# Patient Record
Sex: MALE | Race: WHITE | NOT HISPANIC OR LATINO
[De-identification: names, ages, dates, MRNs, and addresses within clinical notes are randomized per-mention and may not be internally consistent; named-entity substitution may affect disease eponyms.]

---

## 2021-11-12 ENCOUNTER — TRANSCRIPTION ENCOUNTER (OUTPATIENT)
Age: 36
End: 2021-11-12

## 2021-11-13 ENCOUNTER — APPOINTMENT (OUTPATIENT)
Dept: DISASTER EMERGENCY | Facility: HOSPITAL | Age: 36
End: 2021-11-13

## 2021-11-13 ENCOUNTER — OUTPATIENT (OUTPATIENT)
Dept: OUTPATIENT SERVICES | Facility: HOSPITAL | Age: 36
LOS: 1 days | End: 2021-11-13
Payer: COMMERCIAL

## 2021-11-13 VITALS
DIASTOLIC BLOOD PRESSURE: 91 MMHG | HEART RATE: 112 BPM | WEIGHT: 315 LBS | HEIGHT: 75 IN | OXYGEN SATURATION: 94 % | RESPIRATION RATE: 18 BRPM | SYSTOLIC BLOOD PRESSURE: 141 MMHG | TEMPERATURE: 99 F

## 2021-11-13 VITALS
TEMPERATURE: 99 F | OXYGEN SATURATION: 96 % | RESPIRATION RATE: 18 BRPM | DIASTOLIC BLOOD PRESSURE: 76 MMHG | HEART RATE: 102 BPM | SYSTOLIC BLOOD PRESSURE: 115 MMHG

## 2021-11-13 DIAGNOSIS — U07.1 COVID-19: ICD-10-CM

## 2021-11-13 PROCEDURE — M0243: CPT

## 2021-11-13 RX ORDER — SODIUM CHLORIDE 9 MG/ML
250 INJECTION INTRAMUSCULAR; INTRAVENOUS; SUBCUTANEOUS
Refills: 0 | Status: DISCONTINUED | OUTPATIENT
Start: 2021-11-13 | End: 2021-11-28

## 2021-11-13 RX ADMIN — SODIUM CHLORIDE 310 MILLILITER(S): 9 INJECTION INTRAMUSCULAR; INTRAVENOUS; SUBCUTANEOUS at 13:43

## 2021-11-13 NOTE — CHART NOTE - NSCHARTNOTEFT_GEN_A_CORE
CC: Monoclonal Antibody Infusion/COVID 19 Positive  36y Male    exam/findings:  T(C): 37.4 (11-13-21 @ 14:07), Max: 37.4 (11-13-21 @ 14:07)  HR: 107 (11-13-21 @ 14:07) (106 - 112)  BP: 110/72 (11-13-21 @ 14:07) (110/72 - 141/91)  RR: 18 (11-13-21 @ 14:07) (18 - 18)  SpO2: 97% (11-13-21 @ 14:07) (93% - 97%)      PE:   Appearance: NAD	  HEENT:   Normal oral mucosa,   Lymphatic: No lymphadenopathy  Cardiovascular: Normal S1 S2, No JVD, No murmurs, No edema  Respiratory: Lungs clear to auscultation	  Gastrointestinal:  Soft, Non-tender, + BS	  Skin: warm and dry, no rash or urticaria   Neurologic: Non-focal  Extremities: Normal range of motion,    ASSESSMENT:  Pt is a 35 yo male with PMHx of HTN on Ben[place]ar, Obese started having sorethroat, generalized weakness , low grade fever since Monday  Covid + 11/10   referred by Dr. Coretta Cooper who presents to infusion center for Monoclonal antibody infusion (Regeneron).    Symptoms/ Criteria: COVID pos  Risk Profile includes: Obese, HTN    PLAN:  - infusion procedure explained to patient    I have reviewed the Regeneron Emergency Use Authorization (EUA) and I have provided the patient or patient's caregiver with the following information:      1. FDA has authorized emergency use of Regeneron( Casirivimab and Imdevimab), which is not an FDA-approved biological product.  2. The patient or patient's caregiver has the option to accept or refuse administration of Regeneron.  3. The significant known and potential risks and benefits of Regeneron and the extent to which such risks and benefits are unknown.  4. Information on available alternative treatments were discussed and recommended to visit https://www.wlhfb06rbqgqpbhlfccopclitm.nih.gov/ for further understanding    - Consent for monoclonal antibody infusion obtained   - Risk & benefits discussed/all questions answered  - infuse Regeneron (casirivimab 1200mg & imdevimab 1200mg) IV over 20 minutes  - observe patient for one hour post infusion, and then if stable discharge home with oupt follow up as planned by PMD.

## 2021-11-14 ENCOUNTER — TRANSCRIPTION ENCOUNTER (OUTPATIENT)
Age: 36
End: 2021-11-14

## 2021-12-27 ENCOUNTER — EMERGENCY (EMERGENCY)
Facility: HOSPITAL | Age: 36
LOS: 1 days | End: 2021-12-27
Admitting: EMERGENCY MEDICINE
Payer: COMMERCIAL

## 2021-12-27 PROBLEM — Z00.00 ENCOUNTER FOR PREVENTIVE HEALTH EXAMINATION: Status: ACTIVE | Noted: 2021-12-27

## 2021-12-27 PROCEDURE — 99285 EMERGENCY DEPT VISIT HI MDM: CPT

## 2021-12-27 PROCEDURE — 99284 EMERGENCY DEPT VISIT MOD MDM: CPT

## 2021-12-27 PROCEDURE — 93970 EXTREMITY STUDY: CPT | Mod: 26

## 2021-12-27 PROCEDURE — 71045 X-RAY EXAM CHEST 1 VIEW: CPT | Mod: 26

## 2021-12-27 PROCEDURE — 93010 ELECTROCARDIOGRAM REPORT: CPT
